# Patient Record
Sex: FEMALE
[De-identification: names, ages, dates, MRNs, and addresses within clinical notes are randomized per-mention and may not be internally consistent; named-entity substitution may affect disease eponyms.]

---

## 2021-05-26 ENCOUNTER — APPOINTMENT (OUTPATIENT)
Dept: OBGYN | Facility: CLINIC | Age: 41
End: 2021-05-26
Payer: COMMERCIAL

## 2021-05-26 VITALS
BODY MASS INDEX: 19.83 KG/M2 | DIASTOLIC BLOOD PRESSURE: 72 MMHG | HEIGHT: 65 IN | WEIGHT: 119.05 LBS | SYSTOLIC BLOOD PRESSURE: 108 MMHG

## 2021-05-26 DIAGNOSIS — Z12.4 ENCOUNTER FOR SCREENING FOR MALIGNANT NEOPLASM OF CERVIX: ICD-10-CM

## 2021-05-26 DIAGNOSIS — Z12.39 ENCOUNTER FOR OTHER SCREENING FOR MALIGNANT NEOPLASM OF BREAST: ICD-10-CM

## 2021-05-26 DIAGNOSIS — N94.10 UNSPECIFIED DYSPAREUNIA: ICD-10-CM

## 2021-05-26 PROBLEM — Z00.00 ENCOUNTER FOR PREVENTIVE HEALTH EXAMINATION: Status: ACTIVE | Noted: 2021-05-26

## 2021-05-26 PROCEDURE — 99214 OFFICE O/P EST MOD 30 MIN: CPT

## 2021-05-27 LAB — HPV HIGH+LOW RISK DNA PNL CVX: NOT DETECTED

## 2021-05-27 NOTE — PHYSICAL EXAM
[Appropriately responsive] : appropriately responsive [Alert] : alert [No Acute Distress] : no acute distress [No Lymphadenopathy] : no lymphadenopathy [No Murmurs] : no murmurs [Soft] : soft [Non-tender] : non-tender [Non-distended] : non-distended [No HSM] : No HSM [No Lesions] : no lesions [No Mass] : no mass [Oriented x3] : oriented x3 [FreeTextEntry6] : nl nipples; slightl discoloration in areaolar area; no lesions [FreeTextEntry7] : + pfann scar [Examination Of The Breasts] : a normal appearance [No Masses] : no breast masses were palpable [Vulvar Atrophy] : vulvar atrophy [Atrophy] : atrophy [Normal] : normal

## 2021-05-27 NOTE — HISTORY OF PRESENT ILLNESS
[Patient reported PAP Smear was normal] : Patient reported PAP Smear was normal [LMP unknown] : LMP unknown [N] : Patient is not sexually active [Y] : Positive pregnancy history [Yes] : Patient has concerns regarding sex [Previously active] : previously active [PapSmeardate] : 2019 [TextBox_31] : saw dr masterson [PGxTotal] : 1 [[facility]] : 1 [PGHxPremature] : 0 [PGHxAbortions] : 0 [[facility] Center[facility]] : 1 [PGHxABInduced] : 0 [PGHxABSpont] : 0 [PGHxEctopic] : 0 [PGHxMultBirths] : 0 [FreeTextEntry1] : dryness and pain

## 2021-06-08 LAB — CYTOLOGY CVX/VAG DOC THIN PREP: NORMAL

## 2021-07-22 ENCOUNTER — TRANSCRIPTION ENCOUNTER (OUTPATIENT)
Age: 41
End: 2021-07-22

## 2022-05-11 ENCOUNTER — NON-APPOINTMENT (OUTPATIENT)
Age: 42
End: 2022-05-11

## 2022-05-12 ENCOUNTER — ASOB RESULT (OUTPATIENT)
Age: 42
End: 2022-05-12

## 2022-05-12 ENCOUNTER — APPOINTMENT (OUTPATIENT)
Dept: OBGYN | Facility: CLINIC | Age: 42
End: 2022-05-12
Payer: COMMERCIAL

## 2022-05-12 ENCOUNTER — APPOINTMENT (OUTPATIENT)
Dept: ANTEPARTUM | Facility: CLINIC | Age: 42
End: 2022-05-12
Payer: SELF-PAY

## 2022-05-12 ENCOUNTER — LABORATORY RESULT (OUTPATIENT)
Age: 42
End: 2022-05-12

## 2022-05-12 VITALS
DIASTOLIC BLOOD PRESSURE: 65 MMHG | BODY MASS INDEX: 21.16 KG/M2 | HEIGHT: 65 IN | SYSTOLIC BLOOD PRESSURE: 104 MMHG | WEIGHT: 127 LBS | HEART RATE: 77 BPM

## 2022-05-12 DIAGNOSIS — Z13.79 ENCOUNTER FOR OTHER SCREENING FOR GENETIC AND CHROMOSOMAL ANOMALIES: ICD-10-CM

## 2022-05-12 DIAGNOSIS — N91.0 PRIMARY AMENORRHEA: ICD-10-CM

## 2022-05-12 PROCEDURE — 76801 OB US < 14 WKS SINGLE FETUS: CPT | Mod: 59

## 2022-05-12 PROCEDURE — 76817 TRANSVAGINAL US OBSTETRIC: CPT

## 2022-05-12 PROCEDURE — 99214 OFFICE O/P EST MOD 30 MIN: CPT | Mod: 25

## 2022-05-12 PROCEDURE — 76813 OB US NUCHAL MEAS 1 GEST: CPT

## 2022-05-12 PROCEDURE — 76801 OB US < 14 WKS SINGLE FETUS: CPT

## 2022-05-12 NOTE — HISTORY OF PRESENT ILLNESS
[Patient reported PAP Smear was normal] : Patient reported PAP Smear was normal [Patient declined bone density test] : Patient declined bone density test [Patient declined colonoscopy] : Patient declined colonoscopy [LMP unknown] : LMP unknown [N] : Patient does not use contraception [Y] : Positive pregnancy history [Mammogramdate] : 11/2021 [BreastSonogramDate] : 11/2021 [PapSmeardate] : 05/2021 [PGHxTotal] : 3 [[facility]] : 1 [[facility] Centeriving] : 1 [PGHxABSpont] : 1

## 2022-05-13 LAB
ABO + RH PNL BLD: NORMAL
BASOPHILS # BLD AUTO: 0.03 K/UL
BASOPHILS NFR BLD AUTO: 0.4 %
BLD GP AB SCN SERPL QL: NORMAL
C TRACH RRNA SPEC QL NAA+PROBE: NOT DETECTED
CMV IGG SERPL QL: <0.2 U/ML
CMV IGG SERPL-IMP: NEGATIVE
CMV IGM SERPL QL: <8 AU/ML
CMV IGM SERPL QL: NEGATIVE
EOSINOPHIL # BLD AUTO: 0.06 K/UL
EOSINOPHIL NFR BLD AUTO: 0.8 %
HBV SURFACE AG SER QL: NONREACTIVE
HCT VFR BLD CALC: 42.6 %
HCV AB SER QL: NONREACTIVE
HCV RNA SERPL NAA+PROBE-LOG IU: NOT DETECTED LOGIU/ML
HCV S/CO RATIO: 0.12 S/CO
HEPC RNA INTERP: NOT DETECTED
HGB A MFR BLD: 97.6 %
HGB A2 MFR BLD: 2.4 %
HGB BLD-MCNC: 12.9 G/DL
HGB FRACT BLD-IMP: NORMAL
HIV1+2 AB SPEC QL IA.RAPID: NONREACTIVE
IMM GRANULOCYTES NFR BLD AUTO: 0.5 %
LYMPHOCYTES # BLD AUTO: 1.46 K/UL
LYMPHOCYTES NFR BLD AUTO: 18.3 %
MAN DIFF?: NORMAL
MCHC RBC-ENTMCNC: 28.6 PG
MCHC RBC-ENTMCNC: 30.3 GM/DL
MCV RBC AUTO: 94.5 FL
MEV IGG FLD QL IA: 104 AU/ML
MEV IGG+IGM SER-IMP: POSITIVE
MONOCYTES # BLD AUTO: 0.59 K/UL
MONOCYTES NFR BLD AUTO: 7.4 %
N GONORRHOEA RRNA SPEC QL NAA+PROBE: NOT DETECTED
NEUTROPHILS # BLD AUTO: 5.8 K/UL
NEUTROPHILS NFR BLD AUTO: 72.6 %
PLATELET # BLD AUTO: 309 K/UL
RBC # BLD: 4.51 M/UL
RBC # FLD: 13.2 %
RUBV IGG FLD-ACNC: 3.6 INDEX
RUBV IGG SER-IMP: POSITIVE
SOURCE AMPLIFICATION: NORMAL
T PALLIDUM AB SER QL IA: NEGATIVE
TSH SERPL-ACNC: 0.62 UIU/ML
VZV AB TITR SER: POSITIVE
VZV IGG SER IF-ACNC: 2229 INDEX
WBC # FLD AUTO: 7.98 K/UL

## 2022-05-17 LAB — BACTERIA UR CULT: NORMAL

## 2022-05-17 RX ORDER — KRILL/OM-3/DHA/EPA/PHOSPHO/AST 1000-230MG
81 CAPSULE ORAL
Refills: 0 | Status: ACTIVE | COMMUNITY
Start: 2022-05-17

## 2022-05-17 NOTE — PLAN
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

## 2022-05-17 NOTE — HISTORY OF PRESENT ILLNESS
[Patient reported mammogram was normal] : Patient reported mammogram was normal [Patient reported PAP Smear was normal] : Patient reported PAP Smear was normal [FreeTextEntry1] : 4/6/2022 [place] [facility]\par had covid 2 weeks ago; feeling well today\par no other symptoms\par prior c/s for arrest at 5 cm after long induction (30 hrs) baby 10 lbs\par still having some nipple pain (still breastfeeding-weening) [Mammogramdate] : 11/2021 [PapSmeardate] : 2021

## 2022-05-17 NOTE — PHYSICAL EXAM
[Chaperone Present] : A chaperone was present in the examining room during all aspects of the physical examination [Appropriately responsive] : appropriately responsive [Alert] : alert [No Acute Distress] : no acute distress [No Lymphadenopathy] : no lymphadenopathy [No Murmurs] : no murmurs [Soft] : soft [Non-tender] : non-tender [Non-distended] : non-distended [No HSM] : No HSM [No Lesions] : no lesions [No Mass] : no mass [Oriented x3] : oriented x3 [FreeTextEntry7] : +pfann scar [Examination Of The Breasts] : a normal appearance [No Masses] : no breast masses were palpable [Labia Majora] : normal [Labia Minora] : normal [Normal] : normal [Uterine Adnexae] : normal

## 2022-05-17 NOTE — PROCEDURE
[Transabdominal OB Sonogram] : Transabdominal OB Sonogram [Intrauterine Pregnancy] : intrauterine pregnancy [Yolk Sac] : yolk sac present [Fetal Heart] : fetal heart present [Current GA by Sonogram: ___ (wks)] : Current GA by Sonogram: [unfilled]Uwks [___ day(s)] : [unfilled] days [Transabdominal OB Sonogram WNL] : Transabdominal OB Sonogram WNL [FreeTextEntry1] : + SIUP at approx 14 weeks + FH

## 2022-06-03 ENCOUNTER — APPOINTMENT (OUTPATIENT)
Dept: ANTEPARTUM | Facility: CLINIC | Age: 42
End: 2022-06-03
Payer: COMMERCIAL

## 2022-06-03 ENCOUNTER — ASOB RESULT (OUTPATIENT)
Age: 42
End: 2022-06-03

## 2022-06-03 PROCEDURE — 76811 OB US DETAILED SNGL FETUS: CPT

## 2022-06-23 ENCOUNTER — APPOINTMENT (OUTPATIENT)
Dept: OBGYN | Facility: CLINIC | Age: 42
End: 2022-06-23
Payer: COMMERCIAL

## 2022-06-23 PROCEDURE — 0502F SUBSEQUENT PRENATAL CARE: CPT

## 2022-07-07 ENCOUNTER — APPOINTMENT (OUTPATIENT)
Dept: ANTEPARTUM | Facility: CLINIC | Age: 42
End: 2022-07-07

## 2022-07-27 DIAGNOSIS — O09.529 SUPERVISION OF ELDERLY MULTIGRAVIDA, UNSPECIFIED TRIMESTER: ICD-10-CM

## 2022-07-27 DIAGNOSIS — Z78.9 OTHER SPECIFIED HEALTH STATUS: ICD-10-CM

## 2022-07-28 ENCOUNTER — NON-APPOINTMENT (OUTPATIENT)
Age: 42
End: 2022-07-28

## 2022-07-28 ENCOUNTER — APPOINTMENT (OUTPATIENT)
Dept: OBGYN | Facility: CLINIC | Age: 42
End: 2022-07-28

## 2022-07-28 PROCEDURE — 0502F SUBSEQUENT PRENATAL CARE: CPT

## 2022-07-29 ENCOUNTER — TRANSCRIPTION ENCOUNTER (OUTPATIENT)
Age: 42
End: 2022-07-29

## 2022-07-29 LAB
BASOPHILS # BLD AUTO: 0.02 K/UL
BASOPHILS NFR BLD AUTO: 0.2 %
EOSINOPHIL # BLD AUTO: 0.06 K/UL
EOSINOPHIL NFR BLD AUTO: 0.7 %
GLUCOSE 1H P 50 G GLC PO SERPL-MCNC: 97 MG/DL
HCT VFR BLD CALC: 34.5 %
HGB BLD-MCNC: 10.6 G/DL
IMM GRANULOCYTES NFR BLD AUTO: 0.7 %
LYMPHOCYTES # BLD AUTO: 1.2 K/UL
LYMPHOCYTES NFR BLD AUTO: 14.3 %
MAN DIFF?: NORMAL
MCHC RBC-ENTMCNC: 28.3 PG
MCHC RBC-ENTMCNC: 30.7 GM/DL
MCV RBC AUTO: 92.2 FL
MONOCYTES # BLD AUTO: 0.72 K/UL
MONOCYTES NFR BLD AUTO: 8.6 %
NEUTROPHILS # BLD AUTO: 6.31 K/UL
NEUTROPHILS NFR BLD AUTO: 75.5 %
PLATELET # BLD AUTO: 233 K/UL
RBC # BLD: 3.74 M/UL
RBC # FLD: 13.5 %
T PALLIDUM AB SER QL IA: NEGATIVE
WBC # FLD AUTO: 8.37 K/UL

## 2022-08-23 ENCOUNTER — APPOINTMENT (OUTPATIENT)
Dept: ANTEPARTUM | Facility: CLINIC | Age: 42
End: 2022-08-23

## 2022-08-23 ENCOUNTER — ASOB RESULT (OUTPATIENT)
Age: 42
End: 2022-08-23

## 2022-08-23 PROCEDURE — 76819 FETAL BIOPHYS PROFIL W/O NST: CPT

## 2022-08-24 ENCOUNTER — APPOINTMENT (OUTPATIENT)
Dept: OBGYN | Facility: CLINIC | Age: 42
End: 2022-08-24

## 2022-08-24 VITALS
DIASTOLIC BLOOD PRESSURE: 62 MMHG | WEIGHT: 141 LBS | HEART RATE: 111 BPM | HEIGHT: 65 IN | BODY MASS INDEX: 23.49 KG/M2 | SYSTOLIC BLOOD PRESSURE: 100 MMHG

## 2022-08-24 PROCEDURE — 0502F SUBSEQUENT PRENATAL CARE: CPT

## 2022-09-21 ENCOUNTER — NON-APPOINTMENT (OUTPATIENT)
Age: 42
End: 2022-09-21

## 2022-09-22 ENCOUNTER — APPOINTMENT (OUTPATIENT)
Dept: OBGYN | Facility: CLINIC | Age: 42
End: 2022-09-22

## 2022-09-24 ENCOUNTER — TRANSCRIPTION ENCOUNTER (OUTPATIENT)
Age: 42
End: 2022-09-24